# Patient Record
Sex: MALE | Race: WHITE | URBAN - METROPOLITAN AREA
[De-identification: names, ages, dates, MRNs, and addresses within clinical notes are randomized per-mention and may not be internally consistent; named-entity substitution may affect disease eponyms.]

---

## 2020-08-12 ENCOUNTER — EMERGENCY (EMERGENCY)
Facility: HOSPITAL | Age: 20
LOS: 0 days | Discharge: HOME | End: 2020-08-12
Payer: COMMERCIAL

## 2020-08-12 VITALS
SYSTOLIC BLOOD PRESSURE: 98 MMHG | DIASTOLIC BLOOD PRESSURE: 56 MMHG | HEART RATE: 74 BPM | RESPIRATION RATE: 16 BRPM | TEMPERATURE: 99 F | OXYGEN SATURATION: 95 %

## 2020-08-12 DIAGNOSIS — Z11.59 ENCOUNTER FOR SCREENING FOR OTHER VIRAL DISEASES: ICD-10-CM

## 2020-08-12 PROCEDURE — U0003: CPT

## 2020-08-12 PROCEDURE — 99283 EMERGENCY DEPT VISIT LOW MDM: CPT

## 2020-08-12 NOTE — ED STATDOCS - PATIENT PORTAL LINK FT
You can access the FollowMyHealth Patient Portal offered by [facility] by registering at the following website: [email or web address]. By joining [employer]’s FollowMyHealth portal, you will also be able to view your health information using other applications (apps) compatible with our system.

## 2020-08-12 NOTE — ED STATDOCS - NSFOLLOWUPINSTRUCTIONS_ED_ALL_ED_FT
Pt here for COVID-19 testing. Pt non toxic. Pt was swabbed for COVID-19 in their car in drive [place] area. [facility] [employer] will call pt with results. return precautions given. Home self-quarantine recommended. Pt agrees with plan of  care.

## 2020-08-13 LAB — SARS-COV-2 RNA SPEC QL NAA+PROBE: SIGNIFICANT CHANGE UP

## 2020-11-23 ENCOUNTER — OUTPATIENT (OUTPATIENT)
Dept: OUTPATIENT SERVICES | Facility: HOSPITAL | Age: 20
LOS: 1 days | End: 2020-11-23
Payer: COMMERCIAL

## 2020-11-23 DIAGNOSIS — Z11.59 ENCOUNTER FOR SCREENING FOR OTHER VIRAL DISEASES: ICD-10-CM

## 2020-11-23 PROBLEM — Z78.9 OTHER SPECIFIED HEALTH STATUS: Chronic | Status: ACTIVE | Noted: 2020-08-12

## 2020-11-23 LAB — SARS-COV-2 RNA SPEC QL NAA+PROBE: SIGNIFICANT CHANGE UP

## 2020-11-23 PROCEDURE — U0003: CPT

## 2020-11-24 DIAGNOSIS — Z11.59 ENCOUNTER FOR SCREENING FOR OTHER VIRAL DISEASES: ICD-10-CM

## 2021-01-04 ENCOUNTER — OUTPATIENT (OUTPATIENT)
Dept: OUTPATIENT SERVICES | Facility: HOSPITAL | Age: 21
LOS: 1 days | End: 2021-01-04
Payer: COMMERCIAL

## 2021-01-04 DIAGNOSIS — Z20.828 CONTACT WITH AND (SUSPECTED) EXPOSURE TO OTHER VIRAL COMMUNICABLE DISEASES: ICD-10-CM

## 2021-01-04 PROCEDURE — C9803: CPT

## 2021-01-04 PROCEDURE — U0005: CPT

## 2021-01-04 PROCEDURE — U0003: CPT

## 2021-01-05 DIAGNOSIS — Z20.828 CONTACT WITH AND (SUSPECTED) EXPOSURE TO OTHER VIRAL COMMUNICABLE DISEASES: ICD-10-CM

## 2021-01-05 LAB — SARS-COV-2 RNA SPEC QL NAA+PROBE: SIGNIFICANT CHANGE UP

## 2025-04-10 ENCOUNTER — OFFICE (OUTPATIENT)
Dept: URBAN - METROPOLITAN AREA CLINIC 12 | Facility: CLINIC | Age: 25
Setting detail: OPHTHALMOLOGY
End: 2025-04-10
Payer: COMMERCIAL

## 2025-04-10 VITALS — HEIGHT: 69 IN | WEIGHT: 162 LBS

## 2025-04-10 DIAGNOSIS — H52.13: ICD-10-CM

## 2025-04-10 PROCEDURE — SCREF LASIK EVAL: Performed by: OPHTHALMOLOGY

## 2025-04-10 ASSESSMENT — KERATOMETRY
OS_AXISANGLE_DEGREES: 083
OD_AXISANGLE_DEGREES: 097
OS_K2POWER_DIOPTERS: 44.75
OD_K2POWER_DIOPTERS: 45.25
OD_K1POWER_DIOPTERS: 43.25
OS_K1POWER_DIOPTERS: 43.00

## 2025-04-10 ASSESSMENT — REFRACTION_MANIFEST
OD_SPHERE: -1.75
OS_CYLINDER: -1.25
OD_AXIS: 015
OD_CYLINDER: -1.75
OS_VA1: 20/25
OS_AXIS: 165
OS_SPHERE: -1.75
OD_VA1: 20/20-1

## 2025-04-10 ASSESSMENT — CONFRONTATIONAL VISUAL FIELD TEST (CVF)
OS_FINDINGS: FULL
OD_FINDINGS: FULL

## 2025-04-10 ASSESSMENT — REFRACTION_AUTOREFRACTION
OS_CYLINDER: -1.25
OS_AXIS: 165
OD_CYLINDER: -1.75
OD_SPHERE: -1.75
OS_SPHERE: -1.75
OD_AXIS: 014

## 2025-04-10 ASSESSMENT — VISUAL ACUITY
OS_BCVA: 20/150
OD_BCVA: 20/100

## 2025-04-10 ASSESSMENT — TONOMETRY
OD_IOP_MMHG: 17
OS_IOP_MMHG: 13

## 2025-05-12 ENCOUNTER — OFFICE (OUTPATIENT)
Dept: URBAN - METROPOLITAN AREA CLINIC 12 | Facility: CLINIC | Age: 25
Setting detail: OPHTHALMOLOGY
End: 2025-05-12
Payer: COMMERCIAL

## 2025-05-12 DIAGNOSIS — H52.13: ICD-10-CM

## 2025-05-12 PROCEDURE — SCREF LASIK EVAL: Performed by: OPHTHALMOLOGY

## 2025-05-12 ASSESSMENT — TONOMETRY
OD_IOP_MMHG: 14
OS_IOP_MMHG: 14

## 2025-05-12 ASSESSMENT — REFRACTION_MANIFEST
OS_SPHERE: -2.00
OD_VA1: 20/20-
OD_AXIS: 015
OS_AXIS: 165
OD_AXIS: 005
OD_SPHERE: -1.75
OS_VA1: 20/25
OD_SPHERE: -2.00
OS_VA1: 20/20-
OS_CYLINDER: -2.25
OS_AXIS: 170
OS_CYLINDER: -1.25
OD_CYLINDER: -1.75
OD_CYLINDER: -2.25
OD_VA1: 20/20-1
OS_SPHERE: -1.75

## 2025-05-12 ASSESSMENT — KERATOMETRY
OD_AXISANGLE_DEGREES: 097
OS_AXISANGLE_DEGREES: 084
OS_K2POWER_DIOPTERS: 45.25
OS_K1POWER_DIOPTERS: 43.25
OD_K1POWER_DIOPTERS: 43.50
OD_K2POWER_DIOPTERS: 45.75

## 2025-05-12 ASSESSMENT — REFRACTION_CURRENTRX
OD_OVR_VA: 20/
OS_AXIS: 169
OD_SPHERE: -2.25
OD_CYLINDER: -1.50
OS_CYLINDER: -1.25
OS_SPHERE: -2.25
OD_AXIS: 014
OS_VPRISM_DIRECTION: SV
OS_OVR_VA: 20/
OD_VPRISM_DIRECTION: SV

## 2025-05-12 ASSESSMENT — REFRACTION_AUTOREFRACTION
OS_AXIS: 170
OD_CYLINDER: -2.00
OD_AXIS: 010
OD_SPHERE: -2.00
OS_CYLINDER: -1.50
OS_SPHERE: -2.50

## 2025-05-12 ASSESSMENT — VISUAL ACUITY
OD_BCVA: 20/20-2
OS_BCVA: 20/20-2

## 2025-05-12 ASSESSMENT — CONFRONTATIONAL VISUAL FIELD TEST (CVF)
OS_FINDINGS: FULL
OD_FINDINGS: FULL

## 2025-05-14 ENCOUNTER — RX ONLY (RX ONLY)
Age: 25
End: 2025-05-14

## 2025-05-14 ENCOUNTER — OTHER LOCATION (OUTPATIENT)
Dept: URBAN - METROPOLITAN AREA LASIK CENTER 6 | Facility: LASIK CENTER | Age: 25
Setting detail: OPHTHALMOLOGY
End: 2025-05-14

## 2025-05-14 DIAGNOSIS — H52.13: ICD-10-CM

## 2025-05-14 PROCEDURE — 65760 KERATOMILEUSIS: CPT | Mod: GY,RT,LT | Performed by: OPHTHALMOLOGY

## 2025-05-14 ASSESSMENT — KERATOMETRY
OD_K2POWER_DIOPTERS: 45.75
OS_AXISANGLE_DEGREES: 084
OD_K1POWER_DIOPTERS: 43.50
OS_K2POWER_DIOPTERS: 45.25
OD_AXISANGLE_DEGREES: 097
OS_K1POWER_DIOPTERS: 43.25

## 2025-05-14 ASSESSMENT — REFRACTION_CURRENTRX
OS_AXIS: 169
OD_VPRISM_DIRECTION: SV
OD_CYLINDER: -1.50
OS_CYLINDER: -1.25
OD_OVR_VA: 20/
OS_OVR_VA: 20/
OS_SPHERE: -2.25
OD_AXIS: 014
OD_SPHERE: -2.25
OS_VPRISM_DIRECTION: SV

## 2025-05-14 ASSESSMENT — REFRACTION_MANIFEST
OD_AXIS: 005
OS_AXIS: 165
OS_AXIS: 170
OS_SPHERE: -1.75
OD_SPHERE: -1.75
OD_AXIS: 015
OS_CYLINDER: -1.25
OD_VA1: 20/20-
OD_CYLINDER: -2.25
OS_CYLINDER: -2.25
OS_VA1: 20/25
OD_VA1: 20/20-1
OS_SPHERE: -2.00
OS_VA1: 20/20-
OD_SPHERE: -2.00
OD_CYLINDER: -1.75

## 2025-05-14 ASSESSMENT — VISUAL ACUITY
OS_BCVA: 20/20-2
OD_BCVA: 20/20-2

## 2025-05-14 ASSESSMENT — REFRACTION_AUTOREFRACTION
OD_AXIS: 010
OS_SPHERE: -2.50
OS_AXIS: 170
OD_CYLINDER: -2.00
OS_CYLINDER: -1.50
OD_SPHERE: -2.00

## 2025-05-21 ENCOUNTER — OFFICE (OUTPATIENT)
Dept: URBAN - METROPOLITAN AREA CLINIC 12 | Facility: CLINIC | Age: 25
Setting detail: OPHTHALMOLOGY
End: 2025-05-21
Payer: COMMERCIAL

## 2025-05-21 DIAGNOSIS — H52.13: ICD-10-CM

## 2025-05-21 PROCEDURE — 99024 POSTOP FOLLOW-UP VISIT: CPT | Performed by: OPHTHALMOLOGY

## 2025-05-21 ASSESSMENT — TONOMETRY: OD_IOP_MMHG: 12

## 2025-05-21 ASSESSMENT — REFRACTION_MANIFEST
OD_VA1: 20/20-
OS_AXIS: 170
OS_VA1: 20/25
OD_VA1: 20/25
OS_AXIS: 110
OD_AXIS: 015
OS_SPHERE: +0.25
OS_VA1: 20/20-
OD_CYLINDER: -0.75
OD_SPHERE: +0.25
OD_AXIS: 040
OS_SPHERE: -2.00
OD_SPHERE: -2.00
OS_SPHERE: -1.75
OS_AXIS: 165
OS_VA1: 20/25
OS_CYLINDER: -1.25
OD_VA1: 20/20-1
OD_SPHERE: -1.75
OS_CYLINDER: -2.25
OD_AXIS: 005
OD_CYLINDER: -2.25
OS_CYLINDER: -0.25
OD_CYLINDER: -1.75

## 2025-05-21 ASSESSMENT — REFRACTION_CURRENTRX
OS_CYLINDER: -1.25
OD_CYLINDER: -1.50
OD_VPRISM_DIRECTION: SV
OD_AXIS: 014
OD_OVR_VA: 20/
OS_VPRISM_DIRECTION: SV
OS_SPHERE: -2.25
OS_OVR_VA: 20/
OD_SPHERE: -2.25
OS_AXIS: 169

## 2025-05-21 ASSESSMENT — VISUAL ACUITY
OS_BCVA: 20/30-1
OD_BCVA: 20/25+1

## 2025-05-21 ASSESSMENT — REFRACTION_AUTOREFRACTION
OS_CYLINDER: -0.25
OS_AXIS: 107
OD_SPHERE: +0.25
OS_SPHERE: +0.25
OD_CYLINDER: -0.75
OD_AXIS: 038

## 2025-05-21 ASSESSMENT — KERATOMETRY
OS_K2POWER_DIOPTERS: 41.50
METHOD_AUTO_MANUAL: AUTO
OS_AXISANGLE_DEGREES: 087
OD_K2POWER_DIOPTERS: 42.25
OD_AXISANGLE_DEGREES: 107
OD_K1POWER_DIOPTERS: 41.50
OS_K1POWER_DIOPTERS: 41.00

## 2025-05-21 ASSESSMENT — CONFRONTATIONAL VISUAL FIELD TEST (CVF)
OS_FINDINGS: FULL
OD_FINDINGS: FULL